# Patient Record
Sex: FEMALE | Race: ASIAN | Employment: FULL TIME | URBAN - METROPOLITAN AREA
[De-identification: names, ages, dates, MRNs, and addresses within clinical notes are randomized per-mention and may not be internally consistent; named-entity substitution may affect disease eponyms.]

---

## 2017-11-22 PROCEDURE — 87660 TRICHOMONAS VAGIN DIR PROBE: CPT | Performed by: NURSE PRACTITIONER

## 2017-11-22 PROCEDURE — 87480 CANDIDA DNA DIR PROBE: CPT | Performed by: NURSE PRACTITIONER

## 2017-11-22 PROCEDURE — 87510 GARDNER VAG DNA DIR PROBE: CPT | Performed by: NURSE PRACTITIONER

## 2021-06-11 PROBLEM — G89.29 CHRONIC NECK PAIN: Status: ACTIVE | Noted: 2021-06-11

## 2021-06-11 PROBLEM — M54.2 CHRONIC NECK PAIN: Status: ACTIVE | Noted: 2021-06-11

## 2024-01-16 ENCOUNTER — TELEPHONE (OUTPATIENT)
Dept: GENETICS | Facility: HOSPITAL | Age: 29
End: 2024-01-16

## 2024-01-19 ENCOUNTER — APPOINTMENT (OUTPATIENT)
Dept: HEMATOLOGY/ONCOLOGY | Facility: HOSPITAL | Age: 29
End: 2024-01-19
Attending: GENETIC COUNSELOR, MS
Payer: COMMERCIAL

## 2024-01-19 ENCOUNTER — GENETICS ENCOUNTER (OUTPATIENT)
Dept: GENETICS | Age: 29
End: 2024-01-19
Attending: GENETIC COUNSELOR, MS
Payer: COMMERCIAL

## 2024-01-19 ENCOUNTER — NURSE ONLY (OUTPATIENT)
Dept: HEMATOLOGY/ONCOLOGY | Age: 29
End: 2024-01-19
Attending: GENETIC COUNSELOR, MS
Payer: COMMERCIAL

## 2024-01-19 DIAGNOSIS — Z84.81 FH: GENETIC DISEASE CARRIER: Primary | ICD-10-CM

## 2024-01-19 PROCEDURE — 36415 COLL VENOUS BLD VENIPUNCTURE: CPT

## 2024-01-19 NOTE — PROGRESS NOTES
Referring Provider:  Dorota Byrne MD     Reason for Referral:  Kristen Sevilla was referred for genetic counseling because of a family history of carnitine uptake defect (CUD). Ms. Sevilla is a nulliparous 28 year-old woman of   descent. Her ovaries are intact. She denies any personal history of cardiac concerns or hypoglycemia. She has several episodes of syncope, perhaps four in the last ten years, that she attributes to long periods of standing and/or overheating.     Social History:  Ms. Sevilla was seen today by herself. Ms. Sevilla lives in [place].     Family History:   A three generation pedigree was obtained.      Ms. Sevilla one older sister and no brothers. Ms. Sevilla's sister's daughter, Verena, was born in 2022. Verena's  screen came back positive for CUD but additional work-up revealed that Verena is a carrier of CUS and Ms. Sevilla's sister, Laurie, is affected. Ms. Sevilla provided me with copies of Laurie's ([id number]) and Verena's ([id number]) genetic test reports from [facility] today. Laurie tested positive for two [id number] pathogenic variants, c.43G>T and c.248G>T, whereas Verena tested positive for a single [id number] pathogenic variant, c.43G>T.      Ms. Sevilla's mother is 58 years-old.  Ms. Sevilla's mother has two sisters and no brothers. One of Ms. Sevilla's maternal cousins has severe physical and cognitive disabilities of unclear etiology. Ms. Sevilla's maternal grandmother is living in her 80s. Ms. Sevilla's maternal grandfather  in his 60s from lung cancer.     Ms. Sevilla's father is 68  years-old. Ms. Sevilla's father has three brothers and one sister. Ms. Sevilla's paternal grandparents  in their 70s and 80s.      Ms. Sevilla denied any other known family history of birth defects, intellectual disabilities, autism, or known genetic conditions.  Please see the pedigree for additional family history information.    Counseling:   The following information  was discussed with Ms. Sevilla.    Carnitine Update Defect (CUD):  Carnitine uptake defect (CUD) (a.k.a., carnitine transport defect, carnitine transporter deficiency, carnitine uptake deficiency, renal carnitine transport defect, systemic carnitine deficiency) affects approximately 1 in every 100,000 infants. It follows an autosomal recessive pattern of inheritance and is caused by pathogenic variants in the [id number] gene. CUD is a disorder of the carnitine cycle that results in defective fatty acid oxidation. If untreated, CUD can cause brain damage or death. However, with early detection and treatment, individuals with CUD can often lead healthy lives.    CUD encompasses a broad clinical spectrum ranging from infantile-onset between age three months and two years with episodes of hypoketotic hypoglycemia, poor feeding, irritability, lethargy, hepatomegaly, elevated liver transaminases, and hyperammonemia triggered by fasting or common illnesses such as upper respiratory tract infection or gastroenteritis, childhood myopathy involving heart and skeletal muscle with onset between age two and four year, pregnancy-related decreased stamina or exacerbation of cardiac arrhythmia, fatigue in adulthood, to absence of symptoms. Asymptomatic individuals or individuals presenting only with fatigue in adulthood may be identified after  screening identifies low carnitine levels in an infant.     Risk Assessment:   Ms. Sevilla has a 25% (1 in 4) chance to be affected with CUD (compound heterozygous), a 50% (1 in 2) chance to be a carrier of CUD, and a 25% chance to be an unaffected, non-carrier of CUD based on her reported family history. Genetic testing on Ms. Sevilla for the known familial [id number] pathogenic variants is recommended to clarify Ms. Sevilla's risk for CUD and her chance to be a carrier of CUD. This information is necessary to ensure that she is followed appropriately for potential complications of CUD  as an adult or during pregnancy if she inherited both familial variants and to clarify her risk to have a child with CUD.     Genetic Testing (Panel):  The pros, cons, and limitations of genetic testing were discussed. Ms. Sevilla has an HMO that prefers [facility] for testing over [facility]. Testing for the known familial [id number] variants is available at [facility] as part of full gene sequencing. These testing will detect the presence of either known familial variant but may also detect a variant of uncertain significance. A variant of uncertain significance is a DNA change that may or may not alter the function of the gene; therefore, it is usually not possible to determine if the gene variant is associated with an increased risk for a genetic disorder.      Genetic Information Non-Discrimination Act:  The legal protections of the Genetic Information Nondiscrimination Act (NABIL) for health insurance and employment were discussed.  NABIL does not provide protection for life insurance, disability or long-term care insurance.    Summary and Plan:  Ms. Sevilla was referred for genetic counseling because of a family history of CUD. Ms. Sevilla's sister was diagnosed with this autosomal recessive condition after Ms. Sevilla's niece's  screening showed low carnitine levels. Ms. Sevilla has a 25% chance to have CUD based on her sister's diagnosis and genetic testing on Ms. Sevilla is recommended to clarify her risk to be affected as well as to clarify  her chance to be a carrier of CUD.     At the conclusion of the counseling session Ms. Sevilla decided to proceed with genetic testing. Written consent was obtained. The HMO referral for genetic testing (#[id number]) at [facility] is authorized but the CPT code (21229) is incorrect. I will hold the blood sample drawn today to see if I can have the referral corrected to include the correct CPT code (28700). If I am unsuccessful and a new referral is needed, I will discard  the blood sample and request a new referral from Dr. Byrne's office.    Approximately 40 minutes was spent in consultation with Ms. Sevilla.

## 2024-01-26 ENCOUNTER — APPOINTMENT (OUTPATIENT)
Dept: HEMATOLOGY/ONCOLOGY | Facility: HOSPITAL | Age: 29
End: 2024-01-26
Attending: GENETIC COUNSELOR, MS
Payer: COMMERCIAL

## 2024-01-26 ENCOUNTER — APPOINTMENT (OUTPATIENT)
Dept: GENETICS | Facility: HOSPITAL | Age: 29
End: 2024-01-26
Attending: GENETIC COUNSELOR, MS
Payer: COMMERCIAL

## 2024-02-09 ENCOUNTER — GENETICS ENCOUNTER (OUTPATIENT)
Dept: HEMATOLOGY/ONCOLOGY | Facility: HOSPITAL | Age: 29
End: 2024-02-09

## 2024-02-09 NOTE — PROGRESS NOTES
Referring Provider:                    Dorota Byrne MD     Reason for Referral:  Kristen Sevilla had genetic testing performed on 24 because of a family history of carnitine uptake defect (CUD) in her sister.     Genetic Testing Result:  POSITIVE - Ms. Sevilla carries both [id number] pathogenic variants, c.248G>T(p.[id number]) and c.43G>T(p.Ims91Oat), that were identified in her sister. These variants were previously established to be in trans in this family based on Ms. Sevilla's niece carrying only one of these variants. Please refer to the report from [facility] ([id number]) for additional information. These results were discussed with Ms. Sevilla by phone on 24.      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 Ms. Sevilla is 28 years-old and pursued testing for this condition because her 32 year-old sister was found to be affected after Ms. Sevilla's sister's daughter's  screening identified low carnitine levels. Ms. Sevilla has never had her carnitine levels checked. Given these results, this should be done.    Ms. Sevilla should follow-up with her medical providers to discuss recommended medical management for individuals with CUD (example from [facility] is included below). If Ms. Sevilla's medical providers are uncertain as to how best to proceed, they may wish to consult with a specialist such as XIMENA Richardson at [facility] (Phone: [phone], Fax: [phone]).     Ms. Sevilla will follow-up with Dr. Martines's office on 24 for further evaluation as appropriate. In the meantime, Ms. Sevilla is encouraged to call me with any questions about her results.     Cc:  Kristen Sevilla    Katrina recommends the following for individuals with CUD:    To establish the extent of disease and needs of an individual diagnosed with CUD:  Echocardiogram and  electrocardiogram  Serum creatine kinase (CK) concentration  Liver transaminases  Pre-prandial blood glucose concentration  Treatment involves maintenance of appropriate plasma carnitine concentrations through oral L-carnitine supplementation and preventing hypoglycemia (with frequent feeding and avoiding fasting) which typically eliminate the risk of metabolic, hepatic, cardiac, and muscular complications.  Ongoing evaluations include:  Echocardiogram and electrocardiogram. Perform annually during childhood and less frequently in adulthood. Individuals with cardiomyopathy require management and follow up by specialists in cardiology.  Plasma carnitine concentration. Monitor frequently until levels reach the normal range, thereafter, measure three times a year during infancy and early childhood, twice a year in older children, and annually in adults.  Serum CK concentration and liver transaminases. Consider measuring during acute illnesses.  Pregnancy is a metabolically challenging state because energy consumption significantly increases. In addition, plasma carnitine levels are physiologically lower during pregnancy than those of non-pregnant controls. Affected women can have decreased stamina or worsening of cardiac arrhythmia during pregnancy, suggesting that CDSP may manifest or exacerbate during pregnancy. Therefore, all pregnant women with CDSP, including those who are asymptomatic, require close monitoring of plasma carnitine levels and increased carnitine supplementation as needed to maintain normal plasma carnitine levels.      1 Aguila DAVEY. Systemic Primary Carnitine Deficiency. 2012 Mar 15 [Updated 2016 Nov 3]. In: Steve FREITAS, Chucky J, Fredrick LORENZANA, et al., editors. [employer]® [Internet]. [place]): [facility], [place]; 0586-3060. Available from: https://www.ncbi.nlm.nih.gov/books/YMS76612/